# Patient Record
Sex: FEMALE | Race: OTHER | Employment: OTHER | URBAN - METROPOLITAN AREA
[De-identification: names, ages, dates, MRNs, and addresses within clinical notes are randomized per-mention and may not be internally consistent; named-entity substitution may affect disease eponyms.]

---

## 2018-07-30 NOTE — PATIENT DISCUSSION
Patient had to cancel appointment with Dr. Kay Hall due to scheduling conflict. Is scheduled to see dermatologist so will assess with them. Appears to be benign.

## 2018-08-06 NOTE — PATIENT DISCUSSION
Patient advised of the right to post-operative care by the surgeon. Patient is fully informed of, and agreed to, co-management with their primary optometric physician. Post-operative care by the surgeon is not medically necessary and co-management is clinically appropriate. Patient has received itemization of fees related to cataract surgery. Transfer of care letter completed for the patient. Transfer care of the left eye to Dr. Aicha Baxter on 8/6/18. Patient instructed to call immediately if any new distortion, blurring, decreased vision or eye pain.

## 2018-08-06 NOTE — PATIENT DISCUSSION
Patient had to cancel appointment with Dr. Lakshmi Alvarez due to scheduling conflict. Is scheduled to see dermatologist so will assess with them. Appears to be benign.

## 2018-08-06 NOTE — PATIENT DISCUSSION
Patient had to cancel appointment with Dr. Rhina Singh due to scheduling conflict. Is scheduled to see dermatologist so will assess with them. Appears to be benign.

## 2018-08-13 NOTE — PATIENT DISCUSSION
Patient had to cancel appointment with Dr. Stefano Larson due to scheduling conflict. Is scheduled to see dermatologist so will assess with them. Appears to be benign.

## 2018-08-13 NOTE — PATIENT DISCUSSION
Patient had to cancel appointment with Dr. Brannon due to scheduling conflict. Is scheduled to see dermatologist so will assess with them. Appears to be benign.

## 2018-08-13 NOTE — PATIENT DISCUSSION
Patient advised of the right to post-operative care by the surgeon. Patient is fully informed of, and agreed to, co-management with their primary optometric physician. Post-operative care by the surgeon is not medically necessary and co-management is clinically appropriate. Patient has received itemization of fees related to cataract surgery. Transfer of care letter completed for the patient. Transfer care of both eyes to Dr. Annabelle Washington on 8/13/18. Patient instructed to call immediately if any new distortion, blurring, decreased vision or eye pain.

## 2021-12-01 ENCOUNTER — NEW PATIENT COMPREHENSIVE (OUTPATIENT)
Dept: URBAN - METROPOLITAN AREA CLINIC 39 | Facility: CLINIC | Age: 56
End: 2021-12-01

## 2021-12-01 DIAGNOSIS — H52.4: ICD-10-CM

## 2021-12-01 DIAGNOSIS — H52.203: ICD-10-CM

## 2021-12-01 DIAGNOSIS — H52.03: ICD-10-CM

## 2021-12-01 PROCEDURE — 92015 DETERMINE REFRACTIVE STATE: CPT

## 2021-12-01 PROCEDURE — 92004 COMPRE OPH EXAM NEW PT 1/>: CPT

## 2021-12-01 ASSESSMENT — VISUAL ACUITY
OU_SC: 20/25-1
OU_SC: J2
OS_SC: 20/40+2
OD_CC: J2
OD_SC: J3
OU_CC: J1
OS_CC: J2
OS_SC: J5
OD_SC: 20/25-1

## 2021-12-01 ASSESSMENT — TONOMETRY
OS_IOP_MMHG: 11
OD_IOP_MMHG: 12

## 2022-03-07 NOTE — PATIENT DISCUSSION
Patient had to cancel appointment with Dr. Rajesh Matute due to scheduling conflict. Is scheduled to see dermatologist so will assess with them. Appears to be benign.

## 2022-03-07 NOTE — PATIENT DISCUSSION
Patient had to cancel appointment with Dr. Huseyin Bautista due to scheduling conflict. Is scheduled to see dermatologist so will assess with them. Appears to be benign.

## 2025-06-03 NOTE — PATIENT DISCUSSION
Good postoperative appearance. Writer attempt # 1 to call patient regarding Refill RN's message below. No answer, left non-detailed voicemail (VM) with clinic call back number.    If patient returns call back, please review Refill RN's message with patient. Obtain patient responses, and route to the prescribing provider as needed. Thanks!    DIANA DelatorreN, RN, N   [facility]